# Patient Record
Sex: FEMALE | NOT HISPANIC OR LATINO | Employment: OTHER | RURAL
[De-identification: names, ages, dates, MRNs, and addresses within clinical notes are randomized per-mention and may not be internally consistent; named-entity substitution may affect disease eponyms.]

---

## 2018-01-26 VITALS — HEIGHT: 61 IN

## 2018-01-26 RX ORDER — POTASSIUM CHLORIDE 20 MEQ/1
20 TABLET, EXTENDED RELEASE ORAL DAILY
COMMUNITY
End: 2018-02-08

## 2018-01-26 RX ORDER — ESTRADIOL 0.5 MG/1
0.5 TABLET ORAL EVERY OTHER DAY
COMMUNITY

## 2018-01-26 RX ORDER — HYDROCHLOROTHIAZIDE 25 MG/1
25 TABLET ORAL DAILY
COMMUNITY
End: 2018-02-08

## 2018-02-08 ENCOUNTER — OFFICE VISIT (OUTPATIENT)
Dept: CARDIOLOGY | Facility: CLINIC | Age: 68
End: 2018-02-08

## 2018-02-08 VITALS
SYSTOLIC BLOOD PRESSURE: 162 MMHG | HEART RATE: 62 BPM | BODY MASS INDEX: 27.94 KG/M2 | HEIGHT: 61 IN | DIASTOLIC BLOOD PRESSURE: 92 MMHG | WEIGHT: 148 LBS

## 2018-02-08 DIAGNOSIS — G45.4 TGA (TRANSIENT GLOBAL AMNESIA): ICD-10-CM

## 2018-02-08 DIAGNOSIS — I10 ESSENTIAL HYPERTENSION: Primary | ICD-10-CM

## 2018-02-08 DIAGNOSIS — R00.2 PALPITATIONS: ICD-10-CM

## 2018-02-08 PROCEDURE — 99204 OFFICE O/P NEW MOD 45 MIN: CPT | Performed by: INTERNAL MEDICINE

## 2018-02-08 PROCEDURE — 93000 ELECTROCARDIOGRAM COMPLETE: CPT | Performed by: INTERNAL MEDICINE

## 2018-02-08 NOTE — PROGRESS NOTES
Subjective:     Encounter Date:02/08/2018      Patient ID: Janell Iqbal is a 67 y.o. female.    Chief Complaint: HTN, palpitations, TGA    History of Present Illness    Dear Dr. Marion,     I had the pleasure of seeing your patient in cardiac evaluation today.  As you well know, she is a terrence 67-year-old woman with history of palpitations and hypertension.  I care for many of her family members.      About seven months ago she had an episode of nausea and vomiting which then results in confusion and transient amnesia.  This lasted for many hours.  She was evaluated in the hospital with a CAT scan which showed no evidence of stroke.  The symptoms resolved and have not occurred.      She is now getting around to having a cardiac evaluation at your request.      In the past, she has described palpitations and was thought to have SVT.  This has not yet been documented by heart monitor.  She does get palpitations on occasion that she treats with vagal maneuvers.      She has hypertension and is normally on hydrochlorothiazide.  She was in the hospital a few weeks ago with the flu and had to get hydration.  In that setting she stopped her diuretic.          Review of Systems   All other systems reviewed and are negative.    Family History   Problem Relation Age of Onset   • Cancer Father    • Cancer Son    • Cancer Maternal Grandfather      Social History   Substance Use Topics   • Smoking status: Former Smoker   • Smokeless tobacco: None   • Alcohol use No         ECG 12 Lead  Date/Time: 2/8/2018 10:38 AM  Performed by: ASHU VALDERRAMA  Authorized by: ASHU VALDERRAMA   Previous ECG: no previous ECG available  Rhythm: sinus rhythm  BPM: 62  Clinical impression: normal ECG               Objective:     Physical Exam   Constitutional: She is oriented to person, place, and time. She appears well-developed and well-nourished.   HENT:   Head: Normocephalic and atraumatic.   Neck: Normal range of motion. Neck supple.    Cardiovascular: Normal rate, regular rhythm and normal heart sounds.    Pulmonary/Chest: Effort normal and breath sounds normal.   Abdominal: Soft. Bowel sounds are normal.   Musculoskeletal: Normal range of motion.   Neurological: She is alert and oriented to person, place, and time.   Skin: Skin is warm and dry.   Psychiatric: She has a normal mood and affect. Her behavior is normal. Thought content normal.   Vitals reviewed.      Lab Review:       Assessment:          Diagnosis Plan   1. Essential hypertension     2. Palpitations     3. TGA (transient global amnesia)            Plan:       It was a pleasure to see your patient in cardiac evaluation today.  She is a terrence 67-year-old woman with history of hypertension.  Her blood pressure is a little high today; probably because she discontinued her diuretic.  She will resume this.      She has palpitations and probably has some degree of SVT.  If this bothers her a lot, we can do more to diagnose, but currently it is not causing many symptoms.      She had an episode several months ago of what sounds like transient global amnesia.  No evidence of stroke or structural brain disease was found.      I have given her reassurance regarding this.  I do not think that it is likely to recur.  She will see me again as needed.